# Patient Record
Sex: FEMALE | Race: WHITE | NOT HISPANIC OR LATINO
[De-identification: names, ages, dates, MRNs, and addresses within clinical notes are randomized per-mention and may not be internally consistent; named-entity substitution may affect disease eponyms.]

---

## 2024-08-28 PROBLEM — Z00.00 ENCOUNTER FOR PREVENTIVE HEALTH EXAMINATION: Status: ACTIVE | Noted: 2024-08-28

## 2024-08-29 ENCOUNTER — APPOINTMENT (OUTPATIENT)
Dept: BARIATRICS/WEIGHT MGMT | Facility: CLINIC | Age: 64
End: 2024-08-29
Payer: MEDICARE

## 2024-08-29 ENCOUNTER — NON-APPOINTMENT (OUTPATIENT)
Age: 64
End: 2024-08-29

## 2024-08-29 VITALS
HEART RATE: 62 BPM | OXYGEN SATURATION: 96 % | WEIGHT: 293 LBS | DIASTOLIC BLOOD PRESSURE: 85 MMHG | RESPIRATION RATE: 16 BRPM | HEIGHT: 66 IN | SYSTOLIC BLOOD PRESSURE: 147 MMHG | BODY MASS INDEX: 47.09 KG/M2

## 2024-08-29 DIAGNOSIS — Z86.718 PERSONAL HISTORY OF OTHER VENOUS THROMBOSIS AND EMBOLISM: ICD-10-CM

## 2024-08-29 DIAGNOSIS — R74.8 ABNORMAL LEVELS OF OTHER SERUM ENZYMES: ICD-10-CM

## 2024-08-29 DIAGNOSIS — E88.810 METABOLIC SYNDROME: ICD-10-CM

## 2024-08-29 DIAGNOSIS — Z02.82 ENCOUNTER FOR ADOPTION SERVICES: ICD-10-CM

## 2024-08-29 DIAGNOSIS — E66.01 MORBID (SEVERE) OBESITY DUE TO EXCESS CALORIES: ICD-10-CM

## 2024-08-29 DIAGNOSIS — R73.03 PREDIABETES.: ICD-10-CM

## 2024-08-29 DIAGNOSIS — I10 ESSENTIAL (PRIMARY) HYPERTENSION: ICD-10-CM

## 2024-08-29 DIAGNOSIS — M17.9 OSTEOARTHRITIS OF KNEE, UNSPECIFIED: ICD-10-CM

## 2024-08-29 DIAGNOSIS — G47.33 OBSTRUCTIVE SLEEP APNEA (ADULT) (PEDIATRIC): ICD-10-CM

## 2024-08-29 PROCEDURE — 99205 OFFICE O/P NEW HI 60 MIN: CPT

## 2024-08-29 PROCEDURE — G2211 COMPLEX E/M VISIT ADD ON: CPT

## 2024-08-29 RX ORDER — METOPROLOL SUCCINATE 25 MG/1
25 TABLET, EXTENDED RELEASE ORAL TWICE DAILY
Refills: 0 | Status: ACTIVE | COMMUNITY

## 2024-08-29 RX ORDER — LISINOPRIL 20 MG/1
20 TABLET ORAL TWICE DAILY
Refills: 0 | Status: ACTIVE | COMMUNITY

## 2024-08-29 RX ORDER — ROSUVASTATIN CALCIUM 5 MG/1
5 TABLET, FILM COATED ORAL DAILY
Refills: 0 | Status: ACTIVE | COMMUNITY

## 2024-08-29 RX ORDER — SEMAGLUTIDE 1.34 MG/ML
4 INJECTION, SOLUTION SUBCUTANEOUS
Qty: 1 | Refills: 1 | Status: ACTIVE | OUTPATIENT
Start: 2024-08-29

## 2024-08-29 NOTE — HISTORY OF PRESENT ILLNESS
[FreeTextEntry1] : 64F PMH class III obesity, HTN, low HDL, prediabetes, TRUPTI wears dental device, OA (pending TKR), DVT (2018), who presents to weight management for initial evaluation.    Weight/Diet History: Was 'never skinny' but used to run half marathons in her 20's, gained over the years, especially over the past 10 years estimating 50 pounds gain.  She estimates ~15 years ago getting to 220 lbs with significant exercise (2.5 hrs cardio/d), developed plantar fasciitis, which took 3 years to recover.  She has engaged in numerous weight loss interventions, including Weight Watchers, Slim Fast, CrossFit, Aerobic Exercise, tracking intake, working with a dietician, shake diets, exercising 2+ hrs per day.  Highest weight just a bit higher than currently, just over 300 lbs.   Weight today: 296 lbs, BMI 47.78, BF 51%   Diet: B (10 am): whole wheat toast w/peanut butter L (): sandwich, sometimes a salad.  D (7 pm): chicken or sometimes steak. Vegetables/salad Dessert: ice cream bar 100 kcal.  Snack: night- dessert, sometimes nuts Night-time eating: see snacks Beverages: diet coke 3x/d. Estimates 20 oz per day.  Binging: Fast-food/Restaurants: Take-out a few times per week - sushi, Chinese, Sammarinese. 1-2x /mo fast food.  Cook/Prepare meals: Added oils: Food Journal:   Exercise: Limited by knees (no running). Does aqua aerobics, swimming, pool walking, 2-3x/wk for 1 hr.    Sleep: reports 7-8 hours, wears dental device.   Social Hx: No tobacco use. A drink once per month. , lives with spouse. 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

## 2024-08-29 NOTE — ASSESSMENT
[FreeTextEntry1] : 64F PMH class III obesity, HTN, low HDL, prediabetes, TRUPTI wears dental device, OA (pending TKR), DVT (2018), who presents to weight management for initial evaluation.    # Metabolic syndrome (class III obesity - central, low HDL, HTN, prediabetes) c/b TRUPTI, OA: Weight today 296 lbs, BMI 47.78, BF 51%. Has struggled with her weight throughout her life, managed with large volumes of physical activity and various diets. She notes an additional 50 pound weight gain over the past ~10 years to her highest weight of >300 lbs. Diet noted for some refined carbohydrate, meat, vegetables, desserts/nuts, diet coke and likely insufficient water intake. Somewhat regular take-out/fast-food/restaurants. Exercise has been limited by knee OA.  - Food log - Meal planning/prep - Limit diet coke and liquid kcal, ice cream - Defers dietician referral - Updated labs at upcoming PMD appt. - Ozempic paper script provided - F/u 1 month   papOz... papRy...vZep

## 2024-10-16 RX ORDER — PEN NEEDLE, DIABETIC 32 GX 1/6"
32G X 4 MM NEEDLE, DISPOSABLE MISCELLANEOUS
Qty: 1 | Refills: 0 | Status: ACTIVE | COMMUNITY
Start: 2024-10-16 | End: 1900-01-01

## 2024-12-15 PROBLEM — E66.813 CLASS 3 SEVERE OBESITY WITH SERIOUS COMORBIDITY IN ADULT: Status: ACTIVE | Noted: 2024-08-29

## 2024-12-16 ENCOUNTER — APPOINTMENT (OUTPATIENT)
Dept: BARIATRICS/WEIGHT MGMT | Facility: CLINIC | Age: 64
End: 2024-12-16

## 2024-12-16 VITALS
SYSTOLIC BLOOD PRESSURE: 162 MMHG | HEIGHT: 66 IN | OXYGEN SATURATION: 96 % | HEART RATE: 71 BPM | BODY MASS INDEX: 46.85 KG/M2 | WEIGHT: 291.5 LBS | DIASTOLIC BLOOD PRESSURE: 70 MMHG

## 2024-12-16 DIAGNOSIS — R73.03 PREDIABETES.: ICD-10-CM

## 2024-12-16 DIAGNOSIS — I10 ESSENTIAL (PRIMARY) HYPERTENSION: ICD-10-CM

## 2024-12-16 DIAGNOSIS — E88.810 METABOLIC SYNDROME: ICD-10-CM

## 2024-12-16 DIAGNOSIS — E66.01 OBESITY, CLASS 3: ICD-10-CM

## 2024-12-16 DIAGNOSIS — M17.9 OSTEOARTHRITIS OF KNEE, UNSPECIFIED: ICD-10-CM

## 2024-12-16 DIAGNOSIS — R74.8 ABNORMAL LEVELS OF OTHER SERUM ENZYMES: ICD-10-CM

## 2024-12-16 DIAGNOSIS — E66.813 OBESITY, CLASS 3: ICD-10-CM

## 2024-12-16 DIAGNOSIS — G47.33 OBSTRUCTIVE SLEEP APNEA (ADULT) (PEDIATRIC): ICD-10-CM

## 2024-12-16 PROCEDURE — G2211 COMPLEX E/M VISIT ADD ON: CPT

## 2024-12-16 PROCEDURE — 99214 OFFICE O/P EST MOD 30 MIN: CPT
